# Patient Record
(demographics unavailable — no encounter records)

---

## 2024-11-26 NOTE — PHYSICAL EXAM
[Normal Outer Ear/Nose] : the outer ears and nose were normal in appearance [de-identified] : erythema of L membrane, enlarged post auricular lymph node with mild tenderness to palpation

## 2025-04-29 NOTE — HISTORY OF PRESENT ILLNESS
[FreeTextEntry1] : pt presents for annual gyn visit would like to discuss getting "tubes tied" also concerns re possible ovarian cyst

## 2025-07-16 NOTE — PLAN
Mirtazapine refilled per previous.Next appointment due 05/2023.ac    [FreeTextEntry1] : counseled re risks and benefits of HRT